# Patient Record
Sex: FEMALE | NOT HISPANIC OR LATINO | Employment: OTHER | URBAN - NONMETROPOLITAN AREA
[De-identification: names, ages, dates, MRNs, and addresses within clinical notes are randomized per-mention and may not be internally consistent; named-entity substitution may affect disease eponyms.]

---

## 2024-09-21 ENCOUNTER — HOSPITAL ENCOUNTER (OUTPATIENT)
Dept: GENERAL RADIOLOGY | Facility: OTHER | Age: 69
Discharge: HOME | End: 2024-09-21
Attending: PHYSICIAN ASSISTANT
Payer: COMMERCIAL

## 2024-09-21 ENCOUNTER — OFFICE VISIT (OUTPATIENT)
Dept: FAMILY MEDICINE | Facility: OTHER | Age: 69
End: 2024-09-21
Attending: PHYSICIAN ASSISTANT
Payer: COMMERCIAL

## 2024-09-21 VITALS
RESPIRATION RATE: 18 BRPM | OXYGEN SATURATION: 98 % | DIASTOLIC BLOOD PRESSURE: 88 MMHG | TEMPERATURE: 99.3 F | HEART RATE: 66 BPM | HEIGHT: 64 IN | SYSTOLIC BLOOD PRESSURE: 140 MMHG | WEIGHT: 189.5 LBS | BODY MASS INDEX: 32.35 KG/M2

## 2024-09-21 DIAGNOSIS — M10.9 ACUTE GOUT OF LEFT WRIST, UNSPECIFIED CAUSE: Primary | ICD-10-CM

## 2024-09-21 DIAGNOSIS — M25.532 LEFT WRIST PAIN: ICD-10-CM

## 2024-09-21 LAB
BASOPHILS # BLD AUTO: 0 10E3/UL (ref 0–0.2)
BASOPHILS NFR BLD AUTO: 0 %
CRP SERPL-MCNC: 12.86 MG/L
EOSINOPHIL # BLD AUTO: 0.1 10E3/UL (ref 0–0.7)
EOSINOPHIL NFR BLD AUTO: 1 %
ERYTHROCYTE [DISTWIDTH] IN BLOOD BY AUTOMATED COUNT: 14.1 % (ref 10–15)
HCT VFR BLD AUTO: 37.9 % (ref 35–47)
HGB BLD-MCNC: 12.3 G/DL (ref 11.7–15.7)
IMM GRANULOCYTES # BLD: 0 10E3/UL
IMM GRANULOCYTES NFR BLD: 0 %
LYMPHOCYTES # BLD AUTO: 2.3 10E3/UL (ref 0.8–5.3)
LYMPHOCYTES NFR BLD AUTO: 29 %
MCH RBC QN AUTO: 28.4 PG (ref 26.5–33)
MCHC RBC AUTO-ENTMCNC: 32.5 G/DL (ref 31.5–36.5)
MCV RBC AUTO: 88 FL (ref 78–100)
MONOCYTES # BLD AUTO: 0.6 10E3/UL (ref 0–1.3)
MONOCYTES NFR BLD AUTO: 7 %
NEUTROPHILS # BLD AUTO: 5.1 10E3/UL (ref 1.6–8.3)
NEUTROPHILS NFR BLD AUTO: 63 %
NRBC # BLD AUTO: 0 10E3/UL
NRBC BLD AUTO-RTO: 0 /100
PLATELET # BLD AUTO: 258 10E3/UL (ref 150–450)
RBC # BLD AUTO: 4.33 10E6/UL (ref 3.8–5.2)
URATE SERPL-MCNC: 5 MG/DL (ref 2.4–5.7)
WBC # BLD AUTO: 8.1 10E3/UL (ref 4–11)

## 2024-09-21 PROCEDURE — 36415 COLL VENOUS BLD VENIPUNCTURE: CPT | Mod: ZL | Performed by: PHYSICIAN ASSISTANT

## 2024-09-21 PROCEDURE — 85025 COMPLETE CBC W/AUTO DIFF WBC: CPT | Mod: ZL | Performed by: PHYSICIAN ASSISTANT

## 2024-09-21 PROCEDURE — 86140 C-REACTIVE PROTEIN: CPT | Mod: ZL | Performed by: PHYSICIAN ASSISTANT

## 2024-09-21 PROCEDURE — G0463 HOSPITAL OUTPT CLINIC VISIT: HCPCS | Mod: 25 | Performed by: PHYSICIAN ASSISTANT

## 2024-09-21 PROCEDURE — 84550 ASSAY OF BLOOD/URIC ACID: CPT | Mod: ZL | Performed by: PHYSICIAN ASSISTANT

## 2024-09-21 PROCEDURE — G0463 HOSPITAL OUTPT CLINIC VISIT: HCPCS | Performed by: PHYSICIAN ASSISTANT

## 2024-09-21 PROCEDURE — 99203 OFFICE O/P NEW LOW 30 MIN: CPT | Performed by: PHYSICIAN ASSISTANT

## 2024-09-21 PROCEDURE — 73110 X-RAY EXAM OF WRIST: CPT | Mod: LT

## 2024-09-21 RX ORDER — PREDNISONE 20 MG/1
40 TABLET ORAL DAILY
Qty: 10 TABLET | Refills: 0 | Status: SHIPPED | OUTPATIENT
Start: 2024-09-21 | End: 2024-09-26

## 2024-09-21 ASSESSMENT — PAIN SCALES - GENERAL: PAINLEVEL: EXTREME PAIN (9)

## 2024-09-21 NOTE — PROGRESS NOTES
"ASSESSMENT/PLAN:     I have reviewed the nursing notes.  I have reviewed the findings, diagnosis, plan and need for follow up with the patient.    Faviola was seen today for left wrist pain with onset yesterday. No known injury. Afebrile. VSS. XR wrist no acute changes. Labs: CBC unremarkable, Uric acid 5.0, CRP 12.86. Will treat for suspected gout. Wrist immobilized with wrist brace. Rest, elevate, ice, prednisone, tylenol. Return to clinic if symptoms persist/worsen.       Diagnoses and all orders for this visit:    Acute gout of left wrist, unspecified cause  -     predniSONE (DELTASONE) 20 MG tablet; Take 2 tablets (40 mg) by mouth daily for 5 days.    Left wrist pain  -     XR Wrist Left G/E 3 Views; Future  -     CRP inflammation; Future  -     Uric acid; Future  -     CBC and Differential; Future  -     Wrist/Arm/Hand Bracing Supplies Order Wrist Brace; Left; non-thumb spica           I explained my diagnostic considerations and recommendations to the patient, who voiced understanding and agreement with the treatment plan. All questions were answered. We discussed potential side effects of any prescribed or recommended therapies, as well as expectations for response to treatments.    Louann Islas PA-C  [facility]          Nursing Notes:   Trinidad Zheng LPN  9/21/2024  3:49 PM  Signed  Chief Complaint   Patient presents with    Wrist Pain     Left Wrist Pain x 2 Days, Swelling, Warm       Initial BP (!) 152/98 (BP Location: Right arm, Patient Position: Chair, Cuff Size: Adult Regular)   Pulse 66   Temp 99.3  F (37.4  C) (Tympanic)   Resp 18   Ht 1.626 m (5' 4\")   Wt 86 kg (189 lb 8 oz)   SpO2 98%   Breastfeeding No   BMI 32.53 kg/m   Estimated body mass index is 32.53 kg/m  as calculated from the following:    Height as of this encounter: 1.626 m (5' 4\").    Weight as of this encounter: 86 kg (189 lb 8 oz).      Medication Reconciliation: Complete.       Trinidad Zheng, " "LPN on 9/21/2024 at 3:37 PM          SUBJECTIVE:   Faviola Chopra is a 69 year old female who presents to clinic today for evaluation of left wrist pain  Onset: Yesterday morning when she woke up  Course unchanged  Associated symptoms : Painful ulnar wrist, swelling, erythema, warmth.  Pain is 9 out of 10  Treatments: Ibuprofen yesterday, nothing today  No prior similar or episodes  No history of gout  Notes that about 1 day prior to onset of symptoms she moved a small wood pile with the help of a friend.  But she did not notice any pain at this time            History reviewed. No pertinent past medical history.  History reviewed. No pertinent surgical history.  Social History     Tobacco Use    Smoking status: Former     Types: Cigarettes     Passive exposure: Never    Smokeless tobacco: Never   Substance Use Topics    Alcohol use: Yes     Comment: Seldom-Wine     No current outpatient medications on file.     Allergies   Allergen Reactions    Hydrocodone-Acetaminophen Itching    Petroleum Distillate Other (See Comments) and Unknown     'Feels like my back is pinching and like I get sciatica down into my back and butt'   Other Reaction(s) from [facility]: Other - Describe In Comment Field    \"Feels like my back is pinching and like I get sciatica down into my back and butt\"    Sodium Hypochlorite Other (See Comments) and Unknown     'Feels like my back is pinching and like I get sciatica down into my back and butt'   Other Reaction(s) from [facility]: Other - Describe In Comment Field    \"Feels like my back is pinching and like I get sciatica down into my back and butt\"         Past medical history, past surgical history, current medications and allergies reviewed and accurate to the best of my knowledge.          OBJECTIVE:     BP (!) 140/88 (BP Location: Right arm, Patient Position: Chair, Cuff Size: Adult Regular)   Pulse 66   Temp 99.3  F (37.4  C) (Tympanic)   Resp 18   Ht 1.626 m (5' 4\")   Wt 86 " kg (189 lb 8 oz)   SpO2 98%   Breastfeeding No   BMI 32.53 kg/m    Body mass index is 32.53 kg/m .  Physical Exam    General Appearance: Well appearing female, moderate distress with wrist exam  Eyes: no injection, no tearing or drainage, eye lids normal  Musculoskeletal left wrist  Inspection: Patient has this wrapped in a piece of pool noodle and tape once this is removed it is quite painful.  There is swelling and erythema on the volar ulnar wrist area, warmth  Palpation: Tender to palpation, Normal distal pulses, sensation to soft touch  ROM: Limited due to pain    Results for orders placed or performed in visit on 09/21/24   CRP inflammation     Status: Abnormal   Result Value Ref Range    CRP Inflammation 12.86 (H) <5.00 mg/L   Uric acid     Status: Normal   Result Value Ref Range    Uric Acid 5.0 2.4 - 5.7 mg/dL   CBC with platelets and differential     Status: None   Result Value Ref Range    WBC Count 8.1 4.0 - 11.0 10e3/uL    RBC Count 4.33 3.80 - 5.20 10e6/uL    Hemoglobin 12.3 11.7 - 15.7 g/dL    Hematocrit 37.9 35.0 - 47.0 %    MCV 88 78 - 100 fL    MCH 28.4 26.5 - 33.0 pg    MCHC 32.5 31.5 - 36.5 g/dL    RDW 14.1 10.0 - 15.0 %    Platelet Count 258 150 - 450 10e3/uL    % Neutrophils 63 %    % Lymphocytes 29 %    % Monocytes 7 %    % Eosinophils 1 %    % Basophils 0 %    % Immature Granulocytes 0 %    NRBCs per 100 WBC 0 <1 /100    Absolute Neutrophils 5.1 1.6 - 8.3 10e3/uL    Absolute Lymphocytes 2.3 0.8 - 5.3 10e3/uL    Absolute Monocytes 0.6 0.0 - 1.3 10e3/uL    Absolute Eosinophils 0.1 0.0 - 0.7 10e3/uL    Absolute Basophils 0.0 0.0 - 0.2 10e3/uL    Absolute Immature Granulocytes 0.0 <=0.4 10e3/uL    Absolute NRBCs 0.0 10e3/uL   CBC and Differential     Status: None    Narrative    The following orders were created for panel order CBC and Differential.  Procedure                               Abnormality         Status                     ---------                               -----------          ------                     CBC with platelets and d...[[id number]]                      Final result                 Please view results for these tests on the individual orders.

## 2024-09-21 NOTE — NURSING NOTE
"Chief Complaint   Patient presents with    Wrist Pain     Left Wrist Pain x 2 Days, Swelling, Warm       Initial BP (!) 152/98 (BP Location: Right arm, Patient Position: Chair, Cuff Size: Adult Regular)   Pulse 66   Temp 99.3  F (37.4  C) (Tympanic)   Resp 18   Ht 1.626 m (5' 4\")   Wt 86 kg (189 lb 8 oz)   SpO2 98%   Breastfeeding No   BMI 32.53 kg/m   Estimated body mass index is 32.53 kg/m  as calculated from the following:    Height as of this encounter: 1.626 m (5' 4\").    Weight as of this encounter: 86 kg (189 lb 8 oz).      Medication Reconciliation: Complete.       Trinidad Zheng LPN on 9/21/2024 at 3:37 PM     "

## 2024-09-21 NOTE — PATIENT INSTRUCTIONS
Sudden onset left wrist pain  Immobilize with wrist brace  Elevate, ice 10-20 minutes every 1-2 hours  Prednisone 40 mg daily x 5 days  Tylenol 1000 mg every 4-6 hours as needed.